# Patient Record
Sex: FEMALE | Race: OTHER | NOT HISPANIC OR LATINO
[De-identification: names, ages, dates, MRNs, and addresses within clinical notes are randomized per-mention and may not be internally consistent; named-entity substitution may affect disease eponyms.]

---

## 2017-08-01 ENCOUNTER — APPOINTMENT (OUTPATIENT)
Dept: ULTRASOUND IMAGING | Facility: HOSPITAL | Age: 77
End: 2017-08-01

## 2017-08-01 ENCOUNTER — OUTPATIENT (OUTPATIENT)
Dept: OUTPATIENT SERVICES | Facility: HOSPITAL | Age: 77
LOS: 1 days | End: 2017-08-01
Payer: MEDICARE

## 2017-08-01 PROBLEM — Z00.00 ENCOUNTER FOR PREVENTIVE HEALTH EXAMINATION: Status: ACTIVE | Noted: 2017-08-01

## 2017-08-01 PROCEDURE — 93970 EXTREMITY STUDY: CPT | Mod: 26

## 2017-08-01 PROCEDURE — 93970 EXTREMITY STUDY: CPT

## 2017-11-02 ENCOUNTER — APPOINTMENT (OUTPATIENT)
Dept: ULTRASOUND IMAGING | Facility: CLINIC | Age: 77
End: 2017-11-02

## 2017-11-02 ENCOUNTER — APPOINTMENT (OUTPATIENT)
Dept: CT IMAGING | Facility: CLINIC | Age: 77
End: 2017-11-02

## 2017-11-02 ENCOUNTER — OUTPATIENT (OUTPATIENT)
Dept: OUTPATIENT SERVICES | Facility: HOSPITAL | Age: 77
LOS: 1 days | End: 2017-11-02
Payer: COMMERCIAL

## 2017-11-02 DIAGNOSIS — Z00.8 ENCOUNTER FOR OTHER GENERAL EXAMINATION: ICD-10-CM

## 2017-11-02 PROCEDURE — 76376 3D RENDER W/INTRP POSTPROCES: CPT

## 2017-11-02 PROCEDURE — 72192 CT PELVIS W/O DYE: CPT

## 2017-11-02 PROCEDURE — 76882 US LMTD JT/FCL EVL NVASC XTR: CPT

## 2017-11-02 PROCEDURE — 76376 3D RENDER W/INTRP POSTPROCES: CPT | Mod: 26

## 2017-11-02 PROCEDURE — 76882 US LMTD JT/FCL EVL NVASC XTR: CPT | Mod: 26,RT

## 2017-11-02 PROCEDURE — 72192 CT PELVIS W/O DYE: CPT | Mod: 26

## 2017-11-07 DIAGNOSIS — S32.432D: ICD-10-CM

## 2017-11-07 DIAGNOSIS — M16.0 BILATERAL PRIMARY OSTEOARTHRITIS OF HIP: ICD-10-CM

## 2017-11-07 DIAGNOSIS — M79.89 OTHER SPECIFIED SOFT TISSUE DISORDERS: ICD-10-CM

## 2017-11-07 DIAGNOSIS — S32.512G: ICD-10-CM

## 2017-11-07 DIAGNOSIS — S32.111A: ICD-10-CM

## 2018-07-13 ENCOUNTER — EMERGENCY (EMERGENCY)
Facility: HOSPITAL | Age: 78
LOS: 1 days | Discharge: HOME | End: 2018-07-13
Attending: EMERGENCY MEDICINE | Admitting: EMERGENCY MEDICINE
Payer: MEDICARE

## 2018-07-13 VITALS
TEMPERATURE: 99 F | OXYGEN SATURATION: 97 % | HEIGHT: 66 IN | RESPIRATION RATE: 18 BRPM | WEIGHT: 210.1 LBS | DIASTOLIC BLOOD PRESSURE: 96 MMHG | HEART RATE: 84 BPM | SYSTOLIC BLOOD PRESSURE: 207 MMHG

## 2018-07-13 LAB
ALBUMIN SERPL ELPH-MCNC: 3.5 G/DL — SIGNIFICANT CHANGE UP (ref 3.3–5)
ALP SERPL-CCNC: 123 U/L — HIGH (ref 30–120)
ALT FLD-CCNC: 23 U/L DA — SIGNIFICANT CHANGE UP (ref 10–60)
ANION GAP SERPL CALC-SCNC: 5 MMOL/L — SIGNIFICANT CHANGE UP (ref 5–17)
APTT BLD: 33.2 SEC — SIGNIFICANT CHANGE UP (ref 27.5–37.4)
AST SERPL-CCNC: 30 U/L — SIGNIFICANT CHANGE UP (ref 10–40)
BASOPHILS # BLD AUTO: 0.03 K/UL — SIGNIFICANT CHANGE UP (ref 0–0.2)
BASOPHILS NFR BLD AUTO: 0.4 % — SIGNIFICANT CHANGE UP (ref 0–2)
BILIRUB SERPL-MCNC: 0.5 MG/DL — SIGNIFICANT CHANGE UP (ref 0.2–1.2)
BUN SERPL-MCNC: 31 MG/DL — HIGH (ref 7–23)
CALCIUM SERPL-MCNC: 8.7 MG/DL — SIGNIFICANT CHANGE UP (ref 8.4–10.5)
CHLORIDE SERPL-SCNC: 102 MMOL/L — SIGNIFICANT CHANGE UP (ref 96–108)
CK MB BLD-MCNC: 1.5 % — SIGNIFICANT CHANGE UP (ref 0–3.5)
CK MB CFR SERPL CALC: 6.1 NG/ML — HIGH (ref 0–3.6)
CK SERPL-CCNC: 412 U/L — HIGH (ref 26–192)
CO2 SERPL-SCNC: 27 MMOL/L — SIGNIFICANT CHANGE UP (ref 22–31)
CREAT SERPL-MCNC: 1.16 MG/DL — SIGNIFICANT CHANGE UP (ref 0.5–1.3)
D DIMER BLD IA.RAPID-MCNC: <150 NG/ML DDU — SIGNIFICANT CHANGE UP
EOSINOPHIL # BLD AUTO: 0.2 K/UL — SIGNIFICANT CHANGE UP (ref 0–0.5)
EOSINOPHIL NFR BLD AUTO: 2.6 % — SIGNIFICANT CHANGE UP (ref 0–6)
GLUCOSE SERPL-MCNC: 141 MG/DL — HIGH (ref 70–99)
HCT VFR BLD CALC: 42.6 % — SIGNIFICANT CHANGE UP (ref 34.5–45)
HGB BLD-MCNC: 14.1 G/DL — SIGNIFICANT CHANGE UP (ref 11.5–15.5)
IMM GRANULOCYTES NFR BLD AUTO: 0.1 % — SIGNIFICANT CHANGE UP (ref 0–1.5)
INR BLD: 1.06 RATIO — SIGNIFICANT CHANGE UP (ref 0.88–1.16)
LYMPHOCYTES # BLD AUTO: 1.46 K/UL — SIGNIFICANT CHANGE UP (ref 1–3.3)
LYMPHOCYTES # BLD AUTO: 18.8 % — SIGNIFICANT CHANGE UP (ref 13–44)
MCHC RBC-ENTMCNC: 32.6 PG — SIGNIFICANT CHANGE UP (ref 27–34)
MCHC RBC-ENTMCNC: 33.1 GM/DL — SIGNIFICANT CHANGE UP (ref 32–36)
MCV RBC AUTO: 98.4 FL — SIGNIFICANT CHANGE UP (ref 80–100)
MONOCYTES # BLD AUTO: 0.53 K/UL — SIGNIFICANT CHANGE UP (ref 0–0.9)
MONOCYTES NFR BLD AUTO: 6.8 % — SIGNIFICANT CHANGE UP (ref 2–14)
NEUTROPHILS # BLD AUTO: 5.55 K/UL — SIGNIFICANT CHANGE UP (ref 1.8–7.4)
NEUTROPHILS NFR BLD AUTO: 71.3 % — SIGNIFICANT CHANGE UP (ref 43–77)
PLATELET # BLD AUTO: 200 K/UL — SIGNIFICANT CHANGE UP (ref 150–400)
POTASSIUM SERPL-MCNC: 4.2 MMOL/L — SIGNIFICANT CHANGE UP (ref 3.5–5.3)
POTASSIUM SERPL-SCNC: 4.2 MMOL/L — SIGNIFICANT CHANGE UP (ref 3.5–5.3)
PROT SERPL-MCNC: 7.3 G/DL — SIGNIFICANT CHANGE UP (ref 6–8.3)
PROTHROM AB SERPL-ACNC: 11.6 SEC — SIGNIFICANT CHANGE UP (ref 9.8–12.7)
RBC # BLD: 4.33 M/UL — SIGNIFICANT CHANGE UP (ref 3.8–5.2)
RBC # FLD: 12.3 % — SIGNIFICANT CHANGE UP (ref 10.3–14.5)
SODIUM SERPL-SCNC: 134 MMOL/L — LOW (ref 135–145)
TROPONIN I SERPL-MCNC: 0.15 NG/ML — HIGH (ref 0.02–0.06)
WBC # BLD: 7.78 K/UL — SIGNIFICANT CHANGE UP (ref 3.8–10.5)
WBC # FLD AUTO: 7.78 K/UL — SIGNIFICANT CHANGE UP (ref 3.8–10.5)

## 2018-07-13 PROCEDURE — 93010 ELECTROCARDIOGRAM REPORT: CPT

## 2018-07-13 PROCEDURE — 99285 EMERGENCY DEPT VISIT HI MDM: CPT

## 2018-07-13 PROCEDURE — 71275 CT ANGIOGRAPHY CHEST: CPT | Mod: 26

## 2018-07-13 PROCEDURE — 71045 X-RAY EXAM CHEST 1 VIEW: CPT | Mod: 26

## 2018-07-13 RX ORDER — ASPIRIN/CALCIUM CARB/MAGNESIUM 324 MG
325 TABLET ORAL ONCE
Qty: 0 | Refills: 0 | Status: COMPLETED | OUTPATIENT
Start: 2018-07-13 | End: 2018-07-13

## 2018-07-13 RX ORDER — SODIUM CHLORIDE 9 MG/ML
1000 INJECTION INTRAMUSCULAR; INTRAVENOUS; SUBCUTANEOUS ONCE
Qty: 0 | Refills: 0 | Status: COMPLETED | OUTPATIENT
Start: 2018-07-13 | End: 2018-07-13

## 2018-07-13 RX ORDER — SODIUM CHLORIDE 9 MG/ML
3 INJECTION INTRAMUSCULAR; INTRAVENOUS; SUBCUTANEOUS ONCE
Qty: 0 | Refills: 0 | Status: COMPLETED | OUTPATIENT
Start: 2018-07-13 | End: 2018-07-13

## 2018-07-13 RX ADMIN — Medication 325 MILLIGRAM(S): at 18:50

## 2018-07-13 RX ADMIN — SODIUM CHLORIDE 3 MILLILITER(S): 9 INJECTION INTRAMUSCULAR; INTRAVENOUS; SUBCUTANEOUS at 18:52

## 2018-07-13 RX ADMIN — SODIUM CHLORIDE 1000 MILLILITER(S): 9 INJECTION INTRAMUSCULAR; INTRAVENOUS; SUBCUTANEOUS at 18:51

## 2018-07-13 NOTE — ED PROVIDER NOTE - OBJECTIVE STATEMENT
78 y/o F pt w/ PMHx HTN presents to the ED c/o intermittent mid to R sided CP x 4-5 weeks. Pt states episodes can last from 10 minutes to an hour, today the pain has lasted about an hour. States she went to start some gardening when the pain came on. Reports that she has noticed that it has been more difficult to perform strenuous activities, like walking up stairs. Also endorsing SOB. Reports she takes her blood pressure medication daily. Pt denies recent travel, fever, cough or any other complaints at this time.   PMD: Dr. Edmar Amaya  No cardiologist 78 y/o F pt w/ PMHx HTN presents to the ED c/o intermittent mid to R sided CP x 4-5 weeks. Pt states episodes can last from 10 minutes to an hour, today the pain has lasted about an hour. States she went to start some gardening when the pain came on. Reports that she has noticed that it has been more difficult to perform strenuous activities, like walking up stairs. Also endorsing mild SOB. Reports she takes her blood pressure medication daily. Pt denies recent travel, fever, cough or any other complaints at this time.  Pt has stress test scheduled with her husbands c ardiologist on monday.  PMD: Dr. Edmar Amaya  No cardiologist  Pt with hx HTN, no hx MI - pt took all her htn meds today

## 2018-07-13 NOTE — CONSULT NOTE ADULT - ASSESSMENT
The patient is a 77 year old female with a history of HTN who presents with atypical chest pain and borderline elevated troponin level.    Plan:  - The patient's symptoms are atypical for cardiac chest pain  - ECG with no evidence of ischemia or infarction  - First troponin borderline elevated at 0.15. This is either secondary to small NSTEMI or related to other hemodynamic factors (the patient is significantly elevated in ED).  - Give labetalol 200 mg PO now  - Received aspirin 325 mg  - CTA chest ordered to r/o PE, dissection  - Continue to trend cardiac enzymes. If next troponin is similar or improved and patient is pain-free, she can be discharged and follow-up for her scheduled testing on Monday. If troponin trends up, then she should stay for echo and consideration of cardiac catheterization.

## 2018-07-13 NOTE — ED PROVIDER NOTE - NOTES
Dr. Mcnally aware of repeat CE. Ok to d/c. She has nuclear stress test scheduled for Monday which she should go to

## 2018-07-13 NOTE — ED ADULT NURSE NOTE - OBJECTIVE STATEMENT
Amb to ED. Pt c/o sharp intermittent pain in her rt upper chest & shoulder. Denies SOB, diaphoresis or nausea. Denies any trauma. Scheduled for stress test on Monday. Color good. Skin warm & dry to touch Lungs -clear. Abd soft nontender. CM- RSR without ectopics noted.

## 2018-07-13 NOTE — CONSULT NOTE ADULT - SUBJECTIVE AND OBJECTIVE BOX
History of Present Illness: The patient is a 77 year old female with a history of HTN who presents with chest pain. The pain has been intermittent over past few weeks. Today she had an episode that started around 2 hours ago. It is right-sided with some right shoulder involvement. It was severe initially but now is a vague pain. The pain occurs randomly and is not exertional. She has had an episode that has awoken her up from sleep. No associated shortness of breath, dizziness, palpitations, nausea. She has a nuclear stress test and echo scheduled for Monday.    Past Medical/Surgical History:  HTN    Medications:  Lisinopril  Dyazide  Verapamil    Family History: Non-contributory family history of premature cardiovascular atherosclerotic disease    Social History: No tobacco, alcohol or drug use    Review of Systems:  General: No fevers, chills, weight loss or gain  Skin: No rashes, color changes  Cardiovascular: No chest pain, orthopnea  Respiratory: No shortness of breath, cough  Gastrointestinal: No nausea, abdominal pain  Genitourinary: No incontinence, pain with urination  Musculoskeletal: No pain, swelling, decreased range of motion  Neurological: No headache, weakness  Psychiatric: No depression, anxiety  Endocrine: No weight loss or gain, increased thirst  All other systems are comprehensively negative.    Physical Exam:  Vitals:        Vital Signs Last 24 Hrs  T(C): 37.1 (13 Jul 2018 18:13), Max: 37.1 (13 Jul 2018 18:13)  T(F): 98.7 (13 Jul 2018 18:13), Max: 98.7 (13 Jul 2018 18:13)  HR: 82 (13 Jul 2018 18:57) (82 - 84)  BP: 181/69 (13 Jul 2018 18:57) (181/69 - 207/96)  BP(mean): --  RR: 16 (13 Jul 2018 18:57) (16 - 18)  SpO2: 98% (13 Jul 2018 18:57) (97% - 98%)  General: NAD  HEENT: MMM  Neck: No JVD, no carotid bruit  Lungs: CTAB  CV: RRR, nl S1/S2, 2/6 systolic murmur  Abdomen: S/NT/ND, +BS  Extremities: No LE edema, no cyanosis  Neuro: AAOx3, non-focal  Skin: No rash    Labs:                        14.1   7.78  )-----------( 200      ( 13 Jul 2018 18:46 )             42.6     07-13    134<L>  |  102  |  31<H>  ----------------------------<  141<H>  4.2   |  27  |  1.16    Ca    8.7      13 Jul 2018 18:46    TPro  7.3  /  Alb  3.5  /  TBili  0.5  /  DBili  x   /  AST  30  /  ALT  23  /  AlkPhos  123<H>  07-13    CARDIAC MARKERS ( 13 Jul 2018 18:46 )  .154 ng/mL / x     / 412 U/L / x     / 6.1 ng/mL      PT/INR - ( 13 Jul 2018 18:46 )   PT: 11.6 sec;   INR: 1.06 ratio         PTT - ( 13 Jul 2018 18:46 )  PTT:33.2 sec    ECG: NSR, normal axis, no ST abnormality, mild QTc prolongation

## 2018-07-13 NOTE — ED PROVIDER NOTE - PROGRESS NOTE DETAILS
Pt seen by Dr SHAILESH Mcnally. Had dw him re pos CE. Agree with CTA, check 6 hr CE and reeval. Pt doing well, no acute changes or events. Will cont to monitor. Patient feels well, no chest pain at this time.  Copies of blood work and CT given.  Advised to f/u with PMD and obtain stress test at [facility] as per scheduled appointment 7/16 with Dr. Jg Perkins and Dr. Padron.  Patient expressed understanding, advised to return to ER for persistent chest pain, SOB, diaphoresis

## 2018-07-13 NOTE — ED PROVIDER NOTE - RESPIRATORY, MLM
Breath sounds clear and equal bilaterally. Breath sounds clear and equal bilaterally. no W/R/R . no chest wall tend.

## 2018-07-14 ENCOUNTER — INPATIENT (INPATIENT)
Facility: HOSPITAL | Age: 78
LOS: 0 days | Discharge: ANOTHER HOSPITAL | DRG: 282 | End: 2018-07-14
Attending: INTERNAL MEDICINE | Admitting: INTERNAL MEDICINE
Payer: MEDICARE

## 2018-07-14 VITALS
SYSTOLIC BLOOD PRESSURE: 147 MMHG | OXYGEN SATURATION: 98 % | TEMPERATURE: 98 F | DIASTOLIC BLOOD PRESSURE: 64 MMHG | RESPIRATION RATE: 19 BRPM | HEART RATE: 65 BPM

## 2018-07-14 VITALS
OXYGEN SATURATION: 99 % | HEART RATE: 68 BPM | SYSTOLIC BLOOD PRESSURE: 169 MMHG | DIASTOLIC BLOOD PRESSURE: 63 MMHG | TEMPERATURE: 98 F | RESPIRATION RATE: 16 BRPM

## 2018-07-14 VITALS
HEART RATE: 81 BPM | SYSTOLIC BLOOD PRESSURE: 176 MMHG | RESPIRATION RATE: 18 BRPM | OXYGEN SATURATION: 96 % | HEIGHT: 66 IN | DIASTOLIC BLOOD PRESSURE: 75 MMHG | WEIGHT: 210.1 LBS | TEMPERATURE: 98 F

## 2018-07-14 DIAGNOSIS — Z29.9 ENCOUNTER FOR PROPHYLACTIC MEASURES, UNSPECIFIED: ICD-10-CM

## 2018-07-14 DIAGNOSIS — K21.9 GASTRO-ESOPHAGEAL REFLUX DISEASE WITHOUT ESOPHAGITIS: ICD-10-CM

## 2018-07-14 DIAGNOSIS — S32.401S UNSPECIFIED FRACTURE OF RIGHT ACETABULUM, SEQUELA: Chronic | ICD-10-CM

## 2018-07-14 DIAGNOSIS — I21.4 NON-ST ELEVATION (NSTEMI) MYOCARDIAL INFARCTION: ICD-10-CM

## 2018-07-14 DIAGNOSIS — N32.81 OVERACTIVE BLADDER: ICD-10-CM

## 2018-07-14 DIAGNOSIS — I10 ESSENTIAL (PRIMARY) HYPERTENSION: ICD-10-CM

## 2018-07-14 LAB
APTT BLD: 162 SEC — SIGNIFICANT CHANGE UP (ref 27.5–37.4)
APTT BLD: 59.5 SEC — HIGH (ref 27.5–37.4)
CK MB BLD-MCNC: 0.2 % — SIGNIFICANT CHANGE UP (ref 0–3.5)
CK MB BLD-MCNC: 4.1 % — HIGH (ref 0–3.5)
CK MB BLD-MCNC: 4.6 % — HIGH (ref 0–3.5)
CK MB BLD-MCNC: 5.1 % — HIGH (ref 0–3.5)
CK MB CFR SERPL CALC: 0.5 NG/ML — SIGNIFICANT CHANGE UP (ref 0–3.6)
CK MB CFR SERPL CALC: 4.4 NG/ML — HIGH (ref 0–3.6)
CK MB CFR SERPL CALC: 5.6 NG/ML — HIGH (ref 0–3.6)
CK MB CFR SERPL CALC: 6.4 NG/ML — HIGH (ref 0–3.6)
CK SERPL-CCNC: 109 U/L — SIGNIFICANT CHANGE UP (ref 26–192)
CK SERPL-CCNC: 157 U/L — SIGNIFICANT CHANGE UP (ref 26–192)
CK SERPL-CCNC: 328 U/L — HIGH (ref 26–192)
CK SERPL-CCNC: 95 U/L — SIGNIFICANT CHANGE UP (ref 26–192)
HBA1C BLD-MCNC: 5.7 % — HIGH (ref 4–5.6)
HCT VFR BLD CALC: 41.2 % — SIGNIFICANT CHANGE UP (ref 34.5–45)
HGB BLD-MCNC: 13.7 G/DL — SIGNIFICANT CHANGE UP (ref 11.5–15.5)
MCHC RBC-ENTMCNC: 32.2 PG — SIGNIFICANT CHANGE UP (ref 27–34)
MCHC RBC-ENTMCNC: 33.3 GM/DL — SIGNIFICANT CHANGE UP (ref 32–36)
MCV RBC AUTO: 96.9 FL — SIGNIFICANT CHANGE UP (ref 80–100)
NRBC # BLD: 0 /100 WBCS — SIGNIFICANT CHANGE UP (ref 0–0)
NT-PROBNP SERPL-SCNC: 293 PG/ML — SIGNIFICANT CHANGE UP (ref 0–450)
PLATELET # BLD AUTO: 177 K/UL — SIGNIFICANT CHANGE UP (ref 150–400)
RBC # BLD: 4.25 M/UL — SIGNIFICANT CHANGE UP (ref 3.8–5.2)
RBC # FLD: 12.2 % — SIGNIFICANT CHANGE UP (ref 10.3–14.5)
TROPONIN I SERPL-MCNC: 0 NG/ML — LOW (ref 0.02–0.06)
TROPONIN I SERPL-MCNC: 2.23 NG/ML — HIGH (ref 0.02–0.06)
TROPONIN I SERPL-MCNC: 3.18 NG/ML — HIGH (ref 0.02–0.06)
TROPONIN I SERPL-MCNC: 3.8 NG/ML — HIGH (ref 0.02–0.06)
WBC # BLD: 6.39 K/UL — SIGNIFICANT CHANGE UP (ref 3.8–10.5)
WBC # FLD AUTO: 6.39 K/UL — SIGNIFICANT CHANGE UP (ref 3.8–10.5)

## 2018-07-14 PROCEDURE — 99284 EMERGENCY DEPT VISIT MOD MDM: CPT | Mod: 25

## 2018-07-14 PROCEDURE — 80053 COMPREHEN METABOLIC PANEL: CPT

## 2018-07-14 PROCEDURE — 82553 CREATINE MB FRACTION: CPT

## 2018-07-14 PROCEDURE — 71275 CT ANGIOGRAPHY CHEST: CPT

## 2018-07-14 PROCEDURE — 83036 HEMOGLOBIN GLYCOSYLATED A1C: CPT

## 2018-07-14 PROCEDURE — 93306 TTE W/DOPPLER COMPLETE: CPT | Mod: 26

## 2018-07-14 PROCEDURE — 85379 FIBRIN DEGRADATION QUANT: CPT

## 2018-07-14 PROCEDURE — 84484 ASSAY OF TROPONIN QUANT: CPT

## 2018-07-14 PROCEDURE — 82550 ASSAY OF CK (CPK): CPT

## 2018-07-14 PROCEDURE — 96360 HYDRATION IV INFUSION INIT: CPT

## 2018-07-14 PROCEDURE — 99285 EMERGENCY DEPT VISIT HI MDM: CPT

## 2018-07-14 PROCEDURE — 83880 ASSAY OF NATRIURETIC PEPTIDE: CPT

## 2018-07-14 PROCEDURE — 85610 PROTHROMBIN TIME: CPT

## 2018-07-14 PROCEDURE — 85730 THROMBOPLASTIN TIME PARTIAL: CPT

## 2018-07-14 PROCEDURE — 93005 ELECTROCARDIOGRAM TRACING: CPT

## 2018-07-14 PROCEDURE — 93306 TTE W/DOPPLER COMPLETE: CPT

## 2018-07-14 PROCEDURE — 71045 X-RAY EXAM CHEST 1 VIEW: CPT

## 2018-07-14 PROCEDURE — 36415 COLL VENOUS BLD VENIPUNCTURE: CPT

## 2018-07-14 PROCEDURE — 96374 THER/PROPH/DIAG INJ IV PUSH: CPT

## 2018-07-14 PROCEDURE — 93010 ELECTROCARDIOGRAM REPORT: CPT | Mod: 76

## 2018-07-14 PROCEDURE — 99285 EMERGENCY DEPT VISIT HI MDM: CPT | Mod: 25

## 2018-07-14 PROCEDURE — 85027 COMPLETE CBC AUTOMATED: CPT

## 2018-07-14 RX ORDER — ASPIRIN/CALCIUM CARB/MAGNESIUM 324 MG
1 TABLET ORAL
Qty: 0 | Refills: 0 | COMMUNITY

## 2018-07-14 RX ORDER — PIROXICAM 20 MG/1
1 CAPSULE ORAL
Qty: 0 | Refills: 0 | COMMUNITY

## 2018-07-14 RX ORDER — LISINOPRIL 2.5 MG/1
20 TABLET ORAL DAILY
Qty: 0 | Refills: 0 | Status: DISCONTINUED | OUTPATIENT
Start: 2018-07-14 | End: 2018-07-14

## 2018-07-14 RX ORDER — HEPARIN SODIUM 5000 [USP'U]/ML
5000 INJECTION INTRAVENOUS; SUBCUTANEOUS ONCE
Qty: 0 | Refills: 0 | Status: COMPLETED | OUTPATIENT
Start: 2018-07-14 | End: 2018-07-14

## 2018-07-14 RX ORDER — CLOPIDOGREL BISULFATE 75 MG/1
1 TABLET, FILM COATED ORAL
Qty: 0 | Refills: 0 | COMMUNITY
Start: 2018-07-14

## 2018-07-14 RX ORDER — HYDROCHLOROTHIAZIDE 25 MG
0 TABLET ORAL
Qty: 0 | Refills: 0 | COMMUNITY

## 2018-07-14 RX ORDER — HEPARIN SODIUM 5000 [USP'U]/ML
0 INJECTION INTRAVENOUS; SUBCUTANEOUS
Qty: 0 | Refills: 0 | COMMUNITY
Start: 2018-07-14

## 2018-07-14 RX ORDER — DOCUSATE SODIUM 100 MG
1 CAPSULE ORAL
Qty: 0 | Refills: 0 | COMMUNITY

## 2018-07-14 RX ORDER — ESOMEPRAZOLE MAGNESIUM 40 MG/1
1 CAPSULE, DELAYED RELEASE ORAL
Qty: 0 | Refills: 0 | COMMUNITY

## 2018-07-14 RX ORDER — PANTOPRAZOLE SODIUM 20 MG/1
40 TABLET, DELAYED RELEASE ORAL
Qty: 0 | Refills: 0 | Status: DISCONTINUED | OUTPATIENT
Start: 2018-07-14 | End: 2018-07-14

## 2018-07-14 RX ORDER — OXYBUTYNIN CHLORIDE 5 MG
5 TABLET ORAL
Qty: 0 | Refills: 0 | Status: DISCONTINUED | OUTPATIENT
Start: 2018-07-14 | End: 2018-07-14

## 2018-07-14 RX ORDER — TOLTERODINE TARTRATE 1 MG/1
0 TABLET, FILM COATED ORAL
Qty: 0 | Refills: 0 | COMMUNITY

## 2018-07-14 RX ORDER — ATORVASTATIN CALCIUM 80 MG/1
1 TABLET, FILM COATED ORAL
Qty: 0 | Refills: 0 | COMMUNITY
Start: 2018-07-14

## 2018-07-14 RX ORDER — LISINOPRIL 2.5 MG/1
0 TABLET ORAL
Qty: 0 | Refills: 0 | COMMUNITY

## 2018-07-14 RX ORDER — ASPIRIN/CALCIUM CARB/MAGNESIUM 324 MG
1 TABLET ORAL
Qty: 0 | Refills: 0 | COMMUNITY
Start: 2018-07-14

## 2018-07-14 RX ORDER — CLOPIDOGREL BISULFATE 75 MG/1
300 TABLET, FILM COATED ORAL ONCE
Qty: 0 | Refills: 0 | Status: COMPLETED | OUTPATIENT
Start: 2018-07-14 | End: 2018-07-14

## 2018-07-14 RX ORDER — ZOLPIDEM TARTRATE 10 MG/1
1 TABLET ORAL
Qty: 0 | Refills: 0 | COMMUNITY

## 2018-07-14 RX ORDER — METOPROLOL TARTRATE 50 MG
25 TABLET ORAL EVERY 12 HOURS
Qty: 0 | Refills: 0 | Status: DISCONTINUED | OUTPATIENT
Start: 2018-07-14 | End: 2018-07-14

## 2018-07-14 RX ORDER — ATORVASTATIN CALCIUM 80 MG/1
20 TABLET, FILM COATED ORAL AT BEDTIME
Qty: 0 | Refills: 0 | Status: DISCONTINUED | OUTPATIENT
Start: 2018-07-14 | End: 2018-07-14

## 2018-07-14 RX ORDER — VERAPAMIL HCL 240 MG
240 CAPSULE, EXTENDED RELEASE PELLETS 24 HR ORAL DAILY
Qty: 0 | Refills: 0 | Status: DISCONTINUED | OUTPATIENT
Start: 2018-07-14 | End: 2018-07-14

## 2018-07-14 RX ORDER — DOCUSATE SODIUM 100 MG
100 CAPSULE ORAL
Qty: 0 | Refills: 0 | Status: DISCONTINUED | OUTPATIENT
Start: 2018-07-14 | End: 2018-07-14

## 2018-07-14 RX ORDER — METOPROLOL TARTRATE 50 MG
1 TABLET ORAL
Qty: 0 | Refills: 0 | COMMUNITY
Start: 2018-07-14

## 2018-07-14 RX ORDER — HEPARIN SODIUM 5000 [USP'U]/ML
INJECTION INTRAVENOUS; SUBCUTANEOUS
Qty: 25000 | Refills: 0 | Status: DISCONTINUED | OUTPATIENT
Start: 2018-07-14 | End: 2018-07-14

## 2018-07-14 RX ORDER — ASPIRIN/CALCIUM CARB/MAGNESIUM 324 MG
325 TABLET ORAL DAILY
Qty: 0 | Refills: 0 | Status: DISCONTINUED | OUTPATIENT
Start: 2018-07-14 | End: 2018-07-14

## 2018-07-14 RX ORDER — VERAPAMIL HCL 240 MG
1 CAPSULE, EXTENDED RELEASE PELLETS 24 HR ORAL
Qty: 0 | Refills: 0 | COMMUNITY

## 2018-07-14 RX ORDER — ZOLPIDEM TARTRATE 10 MG/1
5 TABLET ORAL AT BEDTIME
Qty: 0 | Refills: 0 | Status: DISCONTINUED | OUTPATIENT
Start: 2018-07-14 | End: 2018-07-14

## 2018-07-14 RX ORDER — CLOPIDOGREL BISULFATE 75 MG/1
75 TABLET, FILM COATED ORAL DAILY
Qty: 0 | Refills: 0 | Status: DISCONTINUED | OUTPATIENT
Start: 2018-07-15 | End: 2018-07-14

## 2018-07-14 RX ADMIN — Medication 325 MILLIGRAM(S): at 14:51

## 2018-07-14 RX ADMIN — LISINOPRIL 20 MILLIGRAM(S): 2.5 TABLET ORAL at 08:58

## 2018-07-14 RX ADMIN — HEPARIN SODIUM 5000 UNIT(S): 5000 INJECTION INTRAVENOUS; SUBCUTANEOUS at 04:37

## 2018-07-14 RX ADMIN — HEPARIN SODIUM 1000 UNIT(S)/HR: 5000 INJECTION INTRAVENOUS; SUBCUTANEOUS at 04:37

## 2018-07-14 RX ADMIN — CLOPIDOGREL BISULFATE 300 MILLIGRAM(S): 75 TABLET, FILM COATED ORAL at 04:37

## 2018-07-14 RX ADMIN — Medication 240 MILLIGRAM(S): at 14:48

## 2018-07-14 RX ADMIN — HEPARIN SODIUM 700 UNIT(S)/HR: 5000 INJECTION INTRAVENOUS; SUBCUTANEOUS at 15:27

## 2018-07-14 RX ADMIN — Medication 25 MILLIGRAM(S): at 18:03

## 2018-07-14 RX ADMIN — PANTOPRAZOLE SODIUM 40 MILLIGRAM(S): 20 TABLET, DELAYED RELEASE ORAL at 08:53

## 2018-07-14 RX ADMIN — HEPARIN SODIUM 0 UNIT(S)/HR: 5000 INJECTION INTRAVENOUS; SUBCUTANEOUS at 14:15

## 2018-07-14 RX ADMIN — Medication 100 MILLIGRAM(S): at 18:05

## 2018-07-14 NOTE — DISCHARGE NOTE ADULT - MEDICATION SUMMARY - MEDICATIONS TO CHANGE
I will SWITCH the dose or number of times a day I take the medications listed below when I get home from the hospital:    Ecotrin Adult Low Strength 81 mg oral delayed release tablet  -- 1 tab(s) by mouth once a day

## 2018-07-14 NOTE — H&P ADULT - PROBLEM SELECTOR PLAN 1
Patient with NSTEMI POA.  Will place patient on ASA/Plavix/Lopressor/Verapamil and heparin drip as per cardiology.   Monitor on Tele.  Serial CPK and Troponin.  Lipitor empirically.   Cardiology consult noted and appreciated.   Will need cardiac cath, patient is being transferred to [facility] as per her wishes.   Arrangements are being made by cardiology for transfer.

## 2018-07-14 NOTE — DISCHARGE NOTE ADULT - SECONDARY DIAGNOSIS.
Essential hypertension Gastroesophageal reflux disease, esophagitis presence not specified OAB (overactive bladder) Primary osteoarthritis, unspecified site

## 2018-07-14 NOTE — DISCHARGE NOTE ADULT - MEDICATION SUMMARY - MEDICATIONS TO TAKE
I will START or STAY ON the medications listed below when I get home from the hospital:    aspirin 325 mg oral delayed release tablet  -- 1 tab(s) by mouth once a day  -- Indication: For NSTEMI (non-ST elevated myocardial infarction)    lisinopril 20 mg oral tablet  -- orally 2 times a day  -- Indication: For Essential hypertension    verapamil 240 mg/24 hours oral capsule, extended release  -- 1 cap(s) by mouth once a day  -- Indication: For Essential hypertension    heparin 100 units/mL-D5% intravenous solution  -- Heparin drip perotocol for anticoagulation.   -- Indication: For NSTEMI (non-ST elevated myocardial infarction)    atorvastatin 20 mg oral tablet  -- 1 tab(s) by mouth once a day (at bedtime)  -- Indication: For NSTEMI (non-ST elevated myocardial infarction)    clopidogrel 75 mg oral tablet  -- 1 tab(s) by mouth once a day  -- Indication: For NSTEMI (non-ST elevated myocardial infarction)    Ambien 10 mg oral tablet  -- 1 tab(s) by mouth once a day (at bedtime)  -- Indication: For insomnia    metoprolol tartrate 25 mg oral tablet  -- 1 tab(s) by mouth every 12 hours  -- Indication: For Essential hypertension/NSTEMI    Colace 100 mg oral capsule  -- 1 cap(s) by mouth 2 times a day  -- Indication: For Constipation    NexIUM 40 mg oral delayed release capsule  -- 1 cap(s) by mouth once a day  -- Indication: For GERD    tolterodine  -- orally once a day  -- Indication: For OAB (overactive bladder)

## 2018-07-14 NOTE — H&P ADULT - PROBLEM SELECTOR PLAN 2
Continue patient on Metoprolol, Verapamil and lisinopril with holding parameters.   Monitor blood pressure per routine.

## 2018-07-14 NOTE — H&P ADULT - NSHPSOCIALHISTORY_GEN_ALL_CORE
Social History:    Marital Status:   Occupation: Retired  Lives with:     Substance Use : None  Tobacco Usage:  (X) never smoked   (   ) former smoker   (   ) current smoker  (     ) pack year  (        ) last tobacco use date  Alcohol Usage: Denies    (X) Advanced Directives: (     ) declined   [X] health care proxy

## 2018-07-14 NOTE — H&P ADULT - NSHPREVIEWOFSYSTEMS_GEN_ALL_CORE
REVIEW OF SYSTEMS:  CONSTITUTIONAL: No fever, weight loss, or fatigue  EYES: No eye pain, visual disturbances, or discharge  ENMT:  No difficulty hearing, tinnitus, vertigo; No sinus or throat pain  NECK: No pain or stiffness  BREASTS: No pain, masses, or nipple discharge  RESPIRATORY: No cough, wheezing, chills or hemoptysis; No shortness of breath  CARDIOVASCULAR: + chest pain, no palpitations, dizziness, or leg swelling  GASTROINTESTINAL: No abdominal or epigastric pain. No nausea, vomiting, or hematemesis; No diarrhea or constipation. No melena or hematochezia.  GENITOURINARY: No dysuria, frequency, hematuria, or incontinence  NEUROLOGICAL: No headaches, memory loss, loss of strength, numbness, or tremors  SKIN: No itching, burning, rashes, or lesions   LYMPH NODES: No enlarged glands  ENDOCRINE: No heat or cold intolerance; No hair loss; No polydipsia or polyuria  MUSCULOSKELETAL: No joint pain or swelling; No muscle, back, or extremity pain  PSYCHIATRIC: No depression, anxiety, mood swings, or difficulty sleeping  HEME/LYMPH: No easy bruising, or bleeding gums  ALLERGY AND IMMUNOLOGIC: No hives or eczema

## 2018-07-14 NOTE — H&P ADULT - NSHPPHYSICALEXAM_GEN_ALL_CORE
Vital Signs Last 24 Hrs  T(C): 36.9 (14 Jul 2018 14:00), Max: 37.1 (13 Jul 2018 18:13)  T(F): 98.4 (14 Jul 2018 14:00), Max: 98.7 (13 Jul 2018 18:13)  HR: 71 (14 Jul 2018 14:00) (68 - 85)  BP: 157/56 (14 Jul 2018 14:00) (141/71 - 207/96)  BP(mean): --  RR: 19 (14 Jul 2018 14:00) (14 - 20)  SpO2: 98% (14 Jul 2018 14:00) (96% - 99%)    PHYSICAL EXAM:  GENERAL: NAD, well-groomed, well-developed  HEAD:  Atraumatic, Normocephalic  EYES: EOMI, PERRLA, conjunctiva and sclera clear  ENMT: No tonsillar erythema, exudates, or enlargement; Moist mucous membranes, Good dentition, No lesions  NECK: Supple, No JVD, Normal thyroid  CHEST/LUNG: Clear to auscultation bilaterally; No rales, rhonchi, wheezing, or rubs  HEART: Regular rate and rhythm; No murmurs, rubs, or gallops  ABDOMEN: Soft, Nontender, Nondistended; Bowel sounds present  EXTREMITIES:  2+ Peripheral Pulses, No clubbing, cyanosis, or edema  MS: No joint swelling or deformity.   LYMPH: No lymphadenopathy noted  SKIN: No rashes or lesions  NERVOUS SYSTEM:  Motor Strength 4/5 B/L upper and lower extremities; DTRs 2+ intact and symmetric  PSYCH:  Alert & Oriented X3, Good concentration.

## 2018-07-14 NOTE — DISCHARGE NOTE ADULT - NS AS ACTIVITY OBS
Do not drive or operate machinery/Do not make important decisions/Walking-Outdoors allowed/No Heavy lifting/straining/Bathing allowed/Showering allowed/Walking-Indoors allowed

## 2018-07-14 NOTE — DISCHARGE NOTE ADULT - CARE PROVIDER_API CALL
Jaison Amaya), Internal Medicine  [address] [place] [address]  Phone: [phone]  Fax: [phone]

## 2018-07-14 NOTE — ED PROVIDER NOTE - CHPI ED SYMPTOMS NEG
no diaphoresis/no cough/no syncope/no shortness of breath/no vomiting/no fever/no back pain/no nausea

## 2018-07-14 NOTE — ED ADULT NURSE REASSESSMENT NOTE - NS ED NURSE REASSESS COMMENT FT1
pt trop lab was drawn at midnight, trop level was resulted as 0.00. pt was then discharged. Lab advised me that there was an additional specimen. The additional specimen resulted in elevated trop level. pt called and asked to come back to ED for trop lab clarification.
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
## 2018-07-14 NOTE — ED PROVIDER NOTE - OBJECTIVE STATEMENT
77 year old female with a history of HTN presents needing repeat blood work. Patient was seen in the ER yesterday for atypical chest pain intermittent x 2-3 weeks.  She was seen by cardiology (Dr. Mayco Mcnally) and had a normal EKG. 1st troponin mildly elevated and second troponin reported as negative 0.0.  given. Patient had no chest pain and repeat EKG unremarkable, patient d/c'd home.  Shortly after her discharge, ED received a call from the lab stating they had a second specimen for cardiac enzymes with patient label.  This specimen resulted in a troponin of 1.158 and patient was immediately called at home and advised to return to the ER. Patient still denies chest pain, SOB, diaphoresis. PMBISI Amaya
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
## 2018-07-14 NOTE — H&P ADULT - NSHPLABSRESULTS_GEN_ALL_CORE
LABS:                    13.7   6.39  )-----------( 177      ( 14 Jul 2018 13:20 )             41.2     13 Jul 2018 18:46    134    |  102    |  31     ----------------------------<  141    4.2     |  27     |  1.16     Ca    8.7        13 Jul 2018 18:46    TPro  7.3    /  Alb  3.5    /  TBili  0.5    /  DBili  x      /  AST  30     /  ALT  23     /  AlkPhos  123    13 Jul 2018 18:46    PT/INR - ( 13 Jul 2018 18:46 )   PT: 11.6 sec;   INR: 1.06 ratio       PTT - ( 14 Jul 2018 13:20 )  PTT:162 sec    Creatine Kinase, Serum: 109 U/L (07-14 @ 13:18)  Creatine Kinase, Serum: 157 U/L (07-14 @ 03:33)  Creatine Kinase, Serum: 328 U/L (07-13 @ 23:39)  Creatine Kinase, Serum: 412 U/L (07-13 @ 18:46)    Troponin I, Serum: 3.184 ng/mL (07-14 @ 13:18)  Troponin I, Serum: 2.227 ng/mL (07-14 @ 03:33)  Troponin I, Serum: .000 ng/mL (07-13 @ 23:39)  Troponin I, Serum: .154 ng/mL (07-13 @ 18:46)    < from: US Transthoracic Echocardiogram w/Doppler Complete (07.14.18 @ 09:52) >    EXAM:  US TTE W DOPPLER COMPLETE                        PROCEDURE DATE:  07/14/2018    CONCLUSIONS:  Normal left ventricular function with an overall ejection fraction is 65%.  Aortic valve sclerosis.  Mild mitral insufficiency.  Diastolic dysfunction.    < end of copied text >  < from: CT Angio Chest w/ IV Cont (07.13.18 @ 20:01) >    EXAM:  CT ANGIO CHEST (W)AW IC                        PROCEDURE DATE:  07/13/2018    INTERPRETATION:  Clinical information: Chest pain    Axial images obtained, coronal and sagittal images computer reformatted.   94 cc of Omnipaque 350 intravenously administered, 6 cc discarded. MIP   images obtained.    No evidence of pulmonary embolism. Normal contrast enhancement of   pulmonary arterial system.    No pericardial effusion. Coronary artery calcifications visible.      No thoracic aortic aneurysm. No mediastinal or hilar lesions identified.    Central airway intact. Tiny hypodensities present in the thyroid   parenchyma.    No effusion vascular congestion or pneumothorax. Scarring, region of left   cardiophrenic angle. Similar finding right cardiophrenic angle. Minimal   bullous disease both upper lobes.   Trace linear stranding right lung   base. 3 mm subpleural nodule right lower lung.    A small hiatus hernia is present.    Right adrenal gland normal in appearance. 16mm left adrenal nodule   likely adenoma in a patient this age group. The spleen is not enlarged.   Kidneys not totally included on this study. Hypodensities in both   kidneys, small cysts. Dextroscoliosis dorsal spine. Hypodensity right   hepatic lobe series 4 image 137 likely a cyst.      IMPRESSION: No evidence of pulmonary embolism, see above report.    < end of copied text >    < from: Xray Chest 1 View AP/PA (07.13.18 @ 20:14) >    EXAM:  XR CHEST AP OR PA 1V                        PROCEDURE DATE:  07/13/2018    INTERPRETATION:  Clinical information: Chest pain    Frontal view of the chest    No prior studies present for comparison    Clear lungs. No sign of lobar consolidation vascular congestion or   effusion. Heart size within normal limits. Dextroscoliosis dorsal spine.    IMPRESSION:    See above report.    < end of copied text >

## 2018-07-14 NOTE — ED PROVIDER NOTE - NOTES
Aware of positive troponin.  Admit patient, start heparin.  He (or his father) will see patient in the AM.

## 2018-07-14 NOTE — DISCHARGE NOTE ADULT - MEDICATION SUMMARY - MEDICATIONS TO STOP TAKING
I will STOP taking the medications listed below when I get home from the hospital:    hydroCHLOROthiazide  -- every other day    piroxicam 20 mg oral capsule  -- 1 cap(s) by mouth once a day

## 2018-07-14 NOTE — DISCHARGE NOTE ADULT - PLAN OF CARE
Get better. Low salt, low cholesterol diet.  Patient is being transferred to [facility] for further care.   Patient to follow up with Dr. Edmar Amaya after discharge from [place].
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
## 2018-07-14 NOTE — CONSULT NOTE ADULT - ASSESSMENT
76y/o w/f over weight. Seen at [facility] ER.  History HTN, high cholesterol, reform smoker.  Mother with CAD.    Seen complaining of vague right chest and right shoulder pain and occasional some shortness of breath.  This occurs at any time for the last few weeks.  Patient was called and told to come back to the hospital for elevated troponin.    Impression:  NSTEMI    Plan:  - R/O MI protocol.  - On Metoprolol, Lisinopril and Verapamil  - Echocardiogram results pending.  - Watch blood pressure.  - Patient on Plavix, ASA, IV heparin.  - Case discussed with Dr. Stockton and will transfer patient to [facility] when bed available for further cardiac treatment.  - Case also discussed with daughter-Troy.

## 2018-08-27 ENCOUNTER — APPOINTMENT (OUTPATIENT)
Dept: ORTHOPEDIC SURGERY | Facility: CLINIC | Age: 78
End: 2018-08-27
Payer: COMMERCIAL

## 2018-08-27 DIAGNOSIS — S32.409D UNSPECIFIED FRACTURE OF UNSPECIFIED ACETABULUM, SUBSEQUENT ENCOUNTER FOR FRACTURE WITH ROUTINE HEALING: ICD-10-CM

## 2018-08-27 DIAGNOSIS — M17.0 BILATERAL PRIMARY OSTEOARTHRITIS OF KNEE: ICD-10-CM

## 2018-08-27 PROCEDURE — 72170 X-RAY EXAM OF PELVIS: CPT

## 2018-08-27 PROCEDURE — 99214 OFFICE O/P EST MOD 30 MIN: CPT

## 2018-08-27 PROCEDURE — 73564 X-RAY EXAM KNEE 4 OR MORE: CPT | Mod: 50

## 2020-06-03 PROBLEM — K21.9 GASTRO-ESOPHAGEAL REFLUX DISEASE WITHOUT ESOPHAGITIS: Chronic | Status: ACTIVE | Noted: 2018-07-14

## 2020-06-03 PROBLEM — M19.91 PRIMARY OSTEOARTHRITIS, UNSPECIFIED SITE: Chronic | Status: ACTIVE | Noted: 2018-07-14

## 2020-06-03 PROBLEM — I10 ESSENTIAL (PRIMARY) HYPERTENSION: Chronic | Status: ACTIVE | Noted: 2018-07-14

## 2020-06-03 PROBLEM — N32.81 OVERACTIVE BLADDER: Chronic | Status: ACTIVE | Noted: 2018-07-14
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
## 2020-06-15 ENCOUNTER — APPOINTMENT (OUTPATIENT)
Dept: DISASTER EMERGENCY | Facility: CLINIC | Age: 80
End: 2020-06-15

## 2020-09-04 ENCOUNTER — APPOINTMENT (OUTPATIENT)
Dept: CT IMAGING | Facility: CLINIC | Age: 80
End: 2020-09-04
Payer: MEDICARE

## 2020-09-04 ENCOUNTER — OUTPATIENT (OUTPATIENT)
Dept: OUTPATIENT SERVICES | Facility: HOSPITAL | Age: 80
LOS: 1 days | End: 2020-09-04
Payer: MEDICARE

## 2020-09-04 DIAGNOSIS — K31.9 DISEASE OF STOMACH AND DUODENUM, UNSPECIFIED: ICD-10-CM

## 2020-09-04 DIAGNOSIS — R10.13 EPIGASTRIC PAIN: ICD-10-CM

## 2020-09-04 DIAGNOSIS — S32.401S UNSPECIFIED FRACTURE OF RIGHT ACETABULUM, SEQUELA: Chronic | ICD-10-CM

## 2020-09-04 PROCEDURE — 71260 CT THORAX DX C+: CPT | Mod: 26

## 2020-09-04 PROCEDURE — 74177 CT ABD & PELVIS W/CONTRAST: CPT | Mod: 26

## 2020-09-04 PROCEDURE — 82565 ASSAY OF CREATININE: CPT

## 2020-09-04 PROCEDURE — 71260 CT THORAX DX C+: CPT

## 2020-09-04 PROCEDURE — 74177 CT ABD & PELVIS W/CONTRAST: CPT

## 2022-04-29 NOTE — DISCHARGE NOTE ADULT - PATIENT PORTAL LINK FT
PACU
You can access the [facility] Patient Portal, offered by [employer], by registering with the following website: [email or web address]

## 2022-06-11 NOTE — H&P ADULT - DOES THIS PATIENT HAVE A HISTORY OF OR HAS BEEN DX WITH HEART FAILURE?
[facility]    History and Physical - Hospitalist Service       Date of Admission:  6/10/2022    Assessment & Plan      Rajiv Rodriguez is a 68 year old male admitted on 6/10/2022. He presented to the emergency department for evaluation of left arm and leg numbness and incoordination and was found to have an acute stroke for which he is being admitted for further evaluation and treatment.    Cerebrovascular accident (CVA), unspecified mechanism  PFO (patent foramen ovale)   Mixed hyperlipidemia  Prior history of CVA in 2012. Has a known PFO which has not yet been repaired. Presented with transient left arm followed by left leg weakness / incoordination. MRI with acute ischemia involving right precentral gyrus. EKG shows sinus rhythm with PACs. No known history of arrhythmias. Taking aspirin 81 mg and Plavix 75 mg at time of admission. Case discussed between emergency department and stroke neuro.  - Stop aspirin / Plavix  - Ticagrelor 180 mg load, followed by 90 mg bid  - P2Y12 level (attempted to add on to admission blood, but this may not be possible)  - TTE with bubble (OK to wait for this to be done after the weekend or as outpatient)  - Bilateral lower extremity ultrasound to rule out thrombus  - Pelvic MRV to rule out thrombus  - Monitor on telemetry   - Continue atorvastatin 40 mg daily  - A1c, lipids pending    Schizophrenia   Stable mood on admission Managed prior to admission with Abilify 20 mg daily, Zyprexa 5 mg qam / 10 mg qhs, and prn gabapentin (for anxiety).  - Continue home medications    Insurance issues  Patient lost his insurance, which is preventing him from having his PFO repaired.   - Care Transitions consult       Diet: Combination Diet Regular Diet; Low Saturated Fat Diet  DVT Prophylaxis: Pneumatic Compression Devices  Clark Catheter: Not present  Central Lines: None  Cardiac Monitoring: ACTIVE order. Indication: Stroke, acute (48 hours)  Code Status: Full Code  -  "discussed at time of admission       Disposition Plan   Expected Discharge: 1-2 days   Anticipated discharge location:  Awaiting care coordination huddle        The patient's care was discussed with the Attending Physician, Dr. Narciso Cedeno and Patient.    Lillian Higgins PA-C  Hospitalist Service  [facility]  Securely message with the Vocera Web Console (learn more here)  Text page via [employer] Paging/Directory         ______________________________________________________________________    Chief Complaint   \"My left arm was incoordinated yesterday, and my left leg was incoordinated today.\"    History is obtained from the patient, review of EMR, and emergency department sign out from Dr. Dariel Henderson.    History of Present Illness   Rajiv Rodriguez is a 68 year old male who presented to the emergency department for evaluation of neurologic changes.    Patient's left arm was uncoordinated yesterday, had difficulty flexing and extending his elbow due to some weakness and incoordination; this persisted for a few hours but and then improved, but is still minimally persisting.  Today his left leg \"did not function right, and I had difficulty walking.\"  This lasted for about 30 minutes and had resolved by the time he arrived in the emergency department.  Work-up in the emergency department revealed an acute stroke in the right precentral gyrus.    He denies any associated speech changes, confusion, swallowing difficulties, numbness or tingling, headache, vision changes.    Patient has a known PFO and has pursued initial work-up to have this repaired.  However, repair has been delayed due to the combination of an esophageal stricture identified when having his FRIEDA which required dilatation, and then patient lost his insurance, so he has been unable to pursue PFO closure.  Review of systems    Review of systems:  Has chronic difficulty sleeping.  He is occasionally constipated.  The remainder " review of systems is negative.      Review of Systems    The 10 point Review of Systems is negative other than noted in the HPI or here.     Past Medical History    I have reviewed this patient's medical history and updated it with pertinent information if needed.   Past Medical History:   Diagnosis Date     Acute exacerbation of chronic paranoid schizophrenia (H) 3/1/2016     Esophageal stricture 08/28/2018    Esophageal stricture 08/28/2018, s/p dilation     Stroke (H) 11/23/2012    symptoms of sudden unsteadiness and hoarseness of voice. 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       Past Surgical History   I have reviewed this patient's surgical history and updated it with pertinent information if needed.  Past Surgical History:   Procedure Laterality Date     DILATE ESOPHAGUS  08/2017    ESOPHAGEAL DILATION     PROSTATE SURGERY  08/2016    LASER OF PROSTATE W/ GREEN LIGHT PVP       Social History   I have reviewed this patient's social history and updated it with pertinent information if needed.  Social History     Tobacco Use     Smoking status: Never Smoker     Smokeless tobacco: Never Used   Substance Use Topics     Alcohol use: No     Drug use: No       Family History   I have reviewed this patient's family history and updated it with pertinent information if needed.  Family History   Problem Relation Age of Onset     Alzheimer Disease Mother      Cancer Father         skin       Prior to Admission Medications   Prior to Admission Medications   Prescriptions Last Dose Informant Patient Reported? Taking?   ARIPiprazole (ABILIFY) 20 MG tablet   No Yes   Sig: Take 1 tablet (20 mg) by mouth every morning   OLANZapine (ZYPREXA) 10 MG tablet   No No   Sig: Take 1 tablet (10 mg) by mouth At Bedtime   OLANZapine (ZYPREXA) 5 MG tablet   No No   Sig: Take 1 tablet (5 mg) by mouth every morning   atorvastatin  (LIPITOR) 40 MG tablet   No Yes   Sig: Take 1 tablet (40 mg) by mouth daily   clopidogrel (PLAVIX) 75 MG tablet   No No   Sig: Take 1 tablet (75 mg) by mouth daily   gabapentin (NEURONTIN) 100 MG capsule   No No   Sig: Take 1-3 capsules (100-300 mg) by mouth 3 times daily as needed (anxiety)      Facility-Administered Medications: None     Allergies   No Known Allergies    Physical Exam   Vital Signs: Temp: 97.5  F (36.4  C) Temp src: Oral BP: 115/63 Pulse: 59   Resp: 18 SpO2: 97 % O2 Device: None (Room air)    Weight: 181 lbs 14.07 oz    Constitutional: Alert, oriented, cooperative. No apparent distress, appears nontoxic. Speaking in full sentences.     Eyes: Eyes are clear, pupils are reactive. No scleral icterus. Extraocular movements intact.    HEENT: Oropharynx is clear and moist, no lesions. Normocephalic, no evidence of cranial trauma.      Cardiovascular: Regular rhythm and rate, normal S1 and S2. No murmur, rubs, or gallops. Peripheral pulses intact bilaterally. No lower extremity edema.    Respiratory: Lung sounds are clear to auscultation bilaterally without wheezes, rhonchi, or crackles.    GI: Soft, non-distended. Non-tender, no rebound or guarding. No hepatosplenomegaly or masses appreciated. Normal bowel sounds.     Musculoskeletal: Without obvious deformity, normal range of motion. Normal muscle bulk and tone. Distal CMS intact.      Skin: Warm and dry, no rashes or ecchymoses. No mottling of skin.      Neurologic: Patient moves all extremities. Cranial nerves are grossly intact.  is symmetric. Gross strength and sensation are equal bilaterally.  Normal rapid alternating movements.  No pronator drift.  Slight incoordination of finger-to-nose testing with left hand.    Genitourinary: Deferred      Data   Data reviewed today: I reviewed all medications, new labs and imaging results over the last 24 hours. I personally reviewed the EKG tracing showing normal sinus rhythm with PAC.    Recent Labs    Lab 06/10/22  1540   WBC 6.7   HGB 14.7   MCV 82      INR 1.11      POTASSIUM 3.9   CHLORIDE 112*   CO2 27   BUN 30   CR 1.18   ANIONGAP 5   LEROY 9.2   *     Recent Results (from the past 24 hour(s))   MRA Angiogram Neck w/o Contrast    Narrative    EXAM: MR BRAIN W/O CONTRAST, MRA BRAIN ([place]) W/O CONTRAST, MRA NECK (CAROTIDS) W/O CONTRAST  LOCATION: [facility]  DATE/TIME: 6/10/2022 6:27 PM    INDICATION: Weakness. Dizziness. Transient ischemic attack (TIA)  COMPARISON: MRI brain dated 07/13/2020  TECHNIQUE:  1) Routine multiplanar multisequence head MRI without intravenous contrast.  2) 3D time-of-flight head MRA without intravenous contrast.  3) Neck MRA without IV contrast. Stenosis measurements made according to NASCET criteria unless otherwise specified.    FINDINGS:  HEAD MRI:  INTRACRANIAL CONTENTS: Restricted diffusion with associated T2/FLAIR signal hyperintensity predominantly involving the right precentral gyrus suspicious for acute ischemia. Additional chronic bilateral cerebellar hemisphere lacunar infarct. No mass,   acute hemorrhage, or extra-axial fluid collections. Scattered nonspecific T2/FLAIR hyperintensities within the cerebral white matter most consistent with sequelae of mild chronic microangiopathy.     Mild cerebral volume loss without hydrocephalus. Patent basal cisterns. Normal position of the cerebellar tonsils.     SELLA: No abnormality accounting for technique.    OSSEOUS STRUCTURES/SOFT TISSUES: No aggressive osseous lesion involving the calvarium or visualized upper cervical spine. Maintained major intracranial vascular flow voids.    ORBITS: No significant orbital abnormality accounting for technique.     SINUSES/MASTOIDS: Mild mucosal thickening scattered about the paranasal sinuses. No middle ear or mastoid effusion.     HEAD MRA:  ANTERIOR CIRCULATION: No stenosis/occlusion, aneurysm, or high flow vascular malformation.  The anterior communicating artery is patent. The posterior communicating arteries are hypoplastic/absent.     POSTERIOR CIRCULATION: No stenosis/occlusion, aneurysm, or high flow vascular malformation. Balanced vertebrobasilar circulation.     NECK MRA:  RIGHT CAROTID: Normal course and persist caliber of the common carotid artery. Normal course and persist caliber of the extracranial internal carotid artery without evidence of stenosis or dissection.    LEFT CAROTID: Normal course and persist caliber of the common carotid artery. Normal course and persist caliber of the extracranial internal carotid artery without evidence of stenosis or dissection.    VERTEBRAL ARTERIES: Balance configuration without stenosis or dissection.    AORTIC ARCH: Classic three-vessel arch. The origins of the great vessels are unremarkable without significant stenosis.      Impression    IMPRESSION:    HEAD MRI:  1. Findings suspicious for acute ischemia involving the right precentral gyrus.     HEAD MRA:   1. Unremarkable intracranial vasculature.    NECK MRA:  1. Unremarkable neck vasculature.    - - - - - - - - - - - - - - - - - - - - - - - - - - - - - - - - - - - - - - - - - - - - - - - - - - - - - - - - - - - - - - - - - - - - - - - - - - - -   The results above were discussed with Dr. Brewer on 6/10/2022 7:11 PM by Dr. Nathaniel Hansen.  - - - - - - - - - - - - - - - - - - - - - - - - - - - - - - - - - - - - - - - - - - - - - - - - - - - - - - - - - - - - - - - - - - - - - - - - - - - -   MR Brain w/o Contrast    Narrative    EXAM: MR BRAIN W/O CONTRAST, MRA BRAIN ([place]) W/O CONTRAST, MRA NECK (CAROTIDS) W/O CONTRAST  LOCATION: [facility]  DATE/TIME: 6/10/2022 6:27 PM    INDICATION: Weakness. Dizziness. Transient ischemic attack (TIA)  COMPARISON: MRI brain dated 07/13/2020  TECHNIQUE:  1) Routine multiplanar multisequence head MRI without intravenous contrast.  2) 3D time-of-flight head MRA  without intravenous contrast.  3) Neck MRA without IV contrast. Stenosis measurements made according to NASCET criteria unless otherwise specified.    FINDINGS:  HEAD MRI:  INTRACRANIAL CONTENTS: Restricted diffusion with associated T2/FLAIR signal hyperintensity predominantly involving the right precentral gyrus suspicious for acute ischemia. Additional chronic bilateral cerebellar hemisphere lacunar infarct. No mass,   acute hemorrhage, or extra-axial fluid collections. Scattered nonspecific T2/FLAIR hyperintensities within the cerebral white matter most consistent with sequelae of mild chronic microangiopathy.     Mild cerebral volume loss without hydrocephalus. Patent basal cisterns. Normal position of the cerebellar tonsils.     SELLA: No abnormality accounting for technique.    OSSEOUS STRUCTURES/SOFT TISSUES: No aggressive osseous lesion involving the calvarium or visualized upper cervical spine. Maintained major intracranial vascular flow voids.    ORBITS: No significant orbital abnormality accounting for technique.     SINUSES/MASTOIDS: Mild mucosal thickening scattered about the paranasal sinuses. No middle ear or mastoid effusion.     HEAD MRA:  ANTERIOR CIRCULATION: No stenosis/occlusion, aneurysm, or high flow vascular malformation. The anterior communicating artery is patent. The posterior communicating arteries are hypoplastic/absent.     POSTERIOR CIRCULATION: No stenosis/occlusion, aneurysm, or high flow vascular malformation. Balanced vertebrobasilar circulation.     NECK MRA:  RIGHT CAROTID: Normal course and persist caliber of the common carotid artery. Normal course and persist caliber of the extracranial internal carotid artery without evidence of stenosis or dissection.    LEFT CAROTID: Normal course and persist caliber of the common carotid artery. Normal course and persist caliber of the extracranial internal carotid artery without evidence of stenosis or dissection.    VERTEBRAL ARTERIES:  Balance configuration without stenosis or dissection.    AORTIC ARCH: Classic three-vessel arch. The origins of the great vessels are unremarkable without significant stenosis.      Impression    IMPRESSION:    HEAD MRI:  1. Findings suspicious for acute ischemia involving the right precentral gyrus.     HEAD MRA:   1. Unremarkable intracranial vasculature.    NECK MRA:  1. Unremarkable neck vasculature.    - - - - - - - - - - - - - - - - - - - - - - - - - - - - - - - - - - - - - - - - - - - - - - - - - - - - - - - - - - - - - - - - - - - - - - - - - - - -   The results above were discussed with Dr. Brewer on 6/10/2022 7:11 PM by Dr. Nathaniel Hansen.  - - - - - - - - - - - - - - - - - - - - - - - - - - - - - - - - - - - - - - - - - - - - - - - - - - - - - - - - - - - - - - - - - - - - - - - - - - - -   MRA Brain ([place]) wo Contrast    Narrative    EXAM: MR BRAIN W/O CONTRAST, MRA BRAIN ([place]) W/O CONTRAST, MRA NECK (CAROTIDS) W/O CONTRAST  LOCATION: [facility]  DATE/TIME: 6/10/2022 6:27 PM    INDICATION: Weakness. Dizziness. Transient ischemic attack (TIA)  COMPARISON: MRI brain dated 07/13/2020  TECHNIQUE:  1) Routine multiplanar multisequence head MRI without intravenous contrast.  2) 3D time-of-flight head MRA without intravenous contrast.  3) Neck MRA without IV contrast. Stenosis measurements made according to NASCET criteria unless otherwise specified.    FINDINGS:  HEAD MRI:  INTRACRANIAL CONTENTS: Restricted diffusion with associated T2/FLAIR signal hyperintensity predominantly involving the right precentral gyrus suspicious for acute ischemia. Additional chronic bilateral cerebellar hemisphere lacunar infarct. No mass,   acute hemorrhage, or extra-axial fluid collections. Scattered nonspecific T2/FLAIR hyperintensities within the cerebral white matter most consistent with sequelae of mild chronic microangiopathy.     Mild cerebral volume loss without  hydrocephalus. Patent basal cisterns. Normal position of the cerebellar tonsils.     SELLA: No abnormality accounting for technique.    OSSEOUS STRUCTURES/SOFT TISSUES: No aggressive osseous lesion involving the calvarium or visualized upper cervical spine. Maintained major intracranial vascular flow voids.    ORBITS: No significant orbital abnormality accounting for technique.     SINUSES/MASTOIDS: Mild mucosal thickening scattered about the paranasal sinuses. No middle ear or mastoid effusion.     HEAD MRA:  ANTERIOR CIRCULATION: No stenosis/occlusion, aneurysm, or high flow vascular malformation. The anterior communicating artery is patent. The posterior communicating arteries are hypoplastic/absent.     POSTERIOR CIRCULATION: No stenosis/occlusion, aneurysm, or high flow vascular malformation. Balanced vertebrobasilar circulation.     NECK MRA:  RIGHT CAROTID: Normal course and persist caliber of the common carotid artery. Normal course and persist caliber of the extracranial internal carotid artery without evidence of stenosis or dissection.    LEFT CAROTID: Normal course and persist caliber of the common carotid artery. Normal course and persist caliber of the extracranial internal carotid artery without evidence of stenosis or dissection.    VERTEBRAL ARTERIES: Balance configuration without stenosis or dissection.    AORTIC ARCH: Classic three-vessel arch. The origins of the great vessels are unremarkable without significant stenosis.      Impression    IMPRESSION:    HEAD MRI:  1. Findings suspicious for acute ischemia involving the right precentral gyrus.     HEAD MRA:   1. Unremarkable intracranial vasculature.    NECK MRA:  1. Unremarkable neck vasculature.    - - - - - - - - - - - - - - - - - - - - - - - - - - - - - - - - - - - - - - - - - - - - - - - - - - - - - - - - - - - - - - - - - - - - - - - - - - - -   The results above were discussed with Dr. Brewer on 6/10/2022 7:11 PM by Dr. Armstrong  Jade.  - - - - - - - - - - - - - - - - - - - - - - - - - - - - - - - - - - - - - - - - - - - - - - - - - - - - - - - - - - - - - - - - - - - - - - - - - - - -      no

## 2022-08-26 ENCOUNTER — APPOINTMENT (OUTPATIENT)
Dept: ORTHOPEDIC SURGERY | Facility: CLINIC | Age: 82
End: 2022-08-26

## 2022-08-26 PROCEDURE — 20610 DRAIN/INJ JOINT/BURSA W/O US: CPT | Mod: RT

## 2022-08-26 PROCEDURE — 99214 OFFICE O/P EST MOD 30 MIN: CPT | Mod: 25

## 2022-08-26 PROCEDURE — 73110 X-RAY EXAM OF WRIST: CPT | Mod: RT

## 2022-08-26 PROCEDURE — 73030 X-RAY EXAM OF SHOULDER: CPT | Mod: RT

## 2022-08-26 NOTE — HISTORY OF PRESENT ILLNESS
[Right Arm] : right arm [5] : 5 [4] : 4 [Burning] : burning [Shooting] : shooting [Stabbing] : stabbing [Intermittent] : intermittent [Rest] : rest [Exercising] : exercising [de-identified] : Return visit for this 81 year female RHD here today for about two weeks of right shoulder pain.  Painful with motion and unable to fully raise her arm.  Painful to lay on her arm.  Tylenol Arthritis for pain.  \par \par Also complaining of a lump on her right wrist that she noticed some months ago.  \par \par PMH:  On Effient, no NSAIDs.  No prior right shoulder issues.   [] : Post Surgical Visit: no [FreeTextEntry1] : right shoulder and right hand  [FreeTextEntry5] : Pt has a gradual onset of pain after being hospitalized for GI issues for two weeks and started noticing right shoulder pain after that\par \par Pt has a hx of a ganglion cyst on her right wrist, pt has pain as well  [de-identified] : None  [de-identified] : 15+ years

## 2022-08-26 NOTE — PHYSICAL EXAM
[Type 3 acromion] : Type 3 acromion [5___] : pinch 5[unfilled]/5 [Right] : right wrist [Degenerative change] : Degenerative change [FreeTextEntry9] : T8 IR. [de-identified] : +Jamal [TWNoteComboBox4] : passive forward flexion 140 degrees [de-identified] : external rotation 60 degrees [] : no erythema [FreeTextEntry3] : 12-13 mm soft fluctuant sub-q lump radial aspect of the right wrist. [FreeTextEntry8] : Widening of the scapholunate interval.  Severe first [facility] osteoarthritis.

## 2022-09-14 NOTE — DISCHARGE NOTE ADULT - CARE PLAN
Problem: Fall Injury Risk  Goal: Absence of Fall and Fall-Related Injury  Intervention: Promote Injury-Free Environment  Recent Flowsheet Documentation  Taken 9/13/2022 2033 by Loretta Hernandez RN  Safety Promotion/Fall Prevention: clutter free environment maintained     Problem: Fall Injury Risk  Goal: Absence of Fall and Fall-Related Injury  Outcome: Ongoing, Progressing  Intervention: Promote Injury-Free Environment  Recent Flowsheet Documentation  Taken 9/13/2022 2033 by Loretta Hernandez RN  Safety Promotion/Fall Prevention: clutter free environment maintained   Goal Outcome Evaluation:                       Principal Discharge DX:	NSTEMI (non-ST elevated myocardial infarction)  Goal:	Get better.  Assessment and plan of treatment:	Low salt, low cholesterol diet.  Patient is being transferred to [facility] for further care.   Patient to follow up with Dr. Edmar Amaya after discharge from [place].  Secondary Diagnosis:	Essential hypertension  Secondary Diagnosis:	Gastroesophageal reflux disease, esophagitis presence not specified  Secondary Diagnosis:	OAB (overactive bladder)  Secondary Diagnosis:	Primary osteoarthritis, unspecified site

## 2022-11-17 ENCOUNTER — APPOINTMENT (OUTPATIENT)
Dept: ORTHOPEDIC SURGERY | Facility: CLINIC | Age: 82
End: 2022-11-17

## 2022-11-17 VITALS — HEIGHT: 64 IN | WEIGHT: 184 LBS | BODY MASS INDEX: 31.41 KG/M2

## 2022-11-17 DIAGNOSIS — I51.9 HEART DISEASE, UNSPECIFIED: ICD-10-CM

## 2022-11-17 DIAGNOSIS — I10 ESSENTIAL (PRIMARY) HYPERTENSION: ICD-10-CM

## 2022-11-17 DIAGNOSIS — J45.909 UNSPECIFIED ASTHMA, UNCOMPLICATED: ICD-10-CM

## 2022-11-17 DIAGNOSIS — M67.431 GANGLION, RIGHT WRIST: ICD-10-CM

## 2022-11-17 DIAGNOSIS — Z78.9 OTHER SPECIFIED HEALTH STATUS: ICD-10-CM

## 2022-11-17 PROCEDURE — 99214 OFFICE O/P EST MOD 30 MIN: CPT | Mod: 25

## 2022-11-17 PROCEDURE — 20610 DRAIN/INJ JOINT/BURSA W/O US: CPT | Mod: RT

## 2022-11-17 PROCEDURE — 20612 ASPIRATE/INJ GANGLION CYST: CPT | Mod: RT

## 2022-11-17 NOTE — HISTORY OF PRESENT ILLNESS
[Right Arm] : right arm [4] : 4 [Burning] : burning [Shooting] : shooting [Stabbing] : stabbing [Intermittent] : intermittent [Rest] : rest [Exercising] : exercising [10] : 10 [Meds] : meds [Retired] : Work status: retired [de-identified] : 11/17/22:  Patient returns today 2 1/2 months after right shoulder cortisone injection for bursitis. Was doing better until 2 months ago after moving something and felt increasing pain radiating down rt arm.\par    Also has noticed recurrence of ganglion rt wrist.despite the aspiration x 2 1/2 months ago.\par \par 08/26/22:  Return visit for this 81 year female RHD here today for about two weeks of right shoulder pain.  Painful with motion and unable to fully raise her arm.  Painful to lay on her arm.  Tylenol Arthritis for pain.  \par \par Also complaining of a lump on her right wrist that she noticed some months ago.  \par \par PMH:  On Effient, no NSAIDs.  No prior right shoulder issues.   [] : Post Surgical Visit: no [FreeTextEntry1] : right shoulder and right hand  [FreeTextEntry5] : Pt has a gradual onset of pain after being hospitalized for GI issues for two weeks and started noticing right shoulder pain after that\par \par Pt has a hx of a ganglion cyst on her right wrist, pt has pain as well  [FreeTextEntry7] : down arm [de-identified] : certain motion [de-identified] : None  [de-identified] : 15+ years

## 2022-11-17 NOTE — PROCEDURE
[Large Joint Injection] : Large joint injection [Right] : of the right [Subacromial Space] : subacromial space [Pain] : pain [Inflammation] : inflammation [___ cc    1%] : Lidocaine ~Vcc of 1%  [___ cc    40mg] : Methylprednisolone (Depomedrol) ~Vcc of 40 mg  [] : Patient tolerated procedure well [Call if redness, pain or fever occur] : call if redness, pain or fever occur [Apply ice for 15min out of every hour for the next 12-24 hours as tolerated] : apply ice for 15 minutes out of every hour for the next 12-24 hours as tolerated [Risks, benefits, alternatives discussed / Verbal consent obtained] : the risks benefits, and alternatives have been discussed, and verbal consent was obtained

## 2022-11-17 NOTE — PHYSICAL EXAM
[Normal Mood and Affect] : normal mood and affect [Orientated] : orientated [Able to Communicate] : able to communicate [Well Developed] : well developed [Well Nourished] : well nourished [Type 3 acromion] : Type 3 acromion [Degenerative change] : Degenerative change [FreeTextEntry8] : Widening of the scapholunate interval.  Severe first CMC osteoarthritis. [Right] : right hand [5 ___] : forward flexion 5[unfilled]/5 [5___] : external rotation 5[unfilled]/5 [FreeTextEntry9] : T8 IR. [de-identified] : +Jamal [TWNoteComboBox4] : passive forward flexion 140 degrees [de-identified] : external rotation 60 degrees [] : no erythema [FreeTextEntry3] : has a 2cm ganglion cyst over radial artery

## 2023-02-07 NOTE — ED ADULT NURSE NOTE - CAS TRG GENERAL NORM CIRC DET
Strong peripheral pulses Star Wedge Flap Text: Because of the tightness of the surrounding skin, the full-thickness nature of the defect with exposed cartilage, and to avoid deformity, a star wedge advancement flap was planned.  After prep and anesthesia, a full-thickness triangular wedge of tissue was excised, as well as two Burow's triangles on either side of this to reduce cupping deformity. The chondrocutaneous flaps were then advanced and closed in a layered fashion.

## 2023-02-23 ENCOUNTER — OFFICE (OUTPATIENT)
Dept: URBAN - METROPOLITAN AREA CLINIC 109 | Facility: CLINIC | Age: 83
Setting detail: OPHTHALMOLOGY
End: 2023-02-23
Payer: MEDICARE

## 2023-02-23 VITALS — HEIGHT: 55 IN

## 2023-02-23 DIAGNOSIS — H25.13: ICD-10-CM

## 2023-02-23 PROCEDURE — 92014 COMPRE OPH EXAM EST PT 1/>: CPT | Performed by: OPHTHALMOLOGY

## 2023-02-23 ASSESSMENT — VISUAL ACUITY
OD_BCVA: 20/30-2
OS_BCVA: 20/30

## 2023-02-23 ASSESSMENT — REFRACTION_CURRENTRX
OS_ADD: +2.50
OD_SPHERE: +1.75
OS_OVR_VA: 20/
OS_SPHERE: +1.75
OD_OVR_VA: 20/
OD_ADD: +2.50

## 2023-02-23 ASSESSMENT — REFRACTION_AUTOREFRACTION
OS_SPHERE: +1.00
OD_SPHERE: +2.00
OS_CYLINDER: -0.25
OD_AXIS: 93
OD_CYLINDER: -0.50
OS_AXIS: 158

## 2023-02-23 ASSESSMENT — CONFRONTATIONAL VISUAL FIELD TEST (CVF)
OD_FINDINGS: FULL
OS_FINDINGS: FULL

## 2023-02-23 ASSESSMENT — SPHEQUIV_DERIVED
OD_SPHEQUIV: 1.75
OS_SPHEQUIV: 0.875

## 2023-02-23 ASSESSMENT — REFRACTION_MANIFEST
OS_SPHERE: +1.25
OS_ADD: +2.50
OD_SPHERE: +1.50
OD_ADD: +2.50

## 2023-02-24 ENCOUNTER — APPOINTMENT (OUTPATIENT)
Dept: ORTHOPEDIC SURGERY | Facility: CLINIC | Age: 83
End: 2023-02-24
Payer: MEDICARE

## 2023-02-24 VITALS — HEIGHT: 64 IN | BODY MASS INDEX: 31.41 KG/M2 | WEIGHT: 184 LBS

## 2023-02-24 PROCEDURE — 99213 OFFICE O/P EST LOW 20 MIN: CPT | Mod: 25

## 2023-02-24 PROCEDURE — 20610 DRAIN/INJ JOINT/BURSA W/O US: CPT | Mod: RT

## 2023-02-24 NOTE — PHYSICAL EXAM
[Normal Mood and Affect] : normal mood and affect [Orientated] : orientated [Able to Communicate] : able to communicate [Well Developed] : well developed [Well Nourished] : well nourished [5 ___] : forward flexion 5[unfilled]/5 [5___] : external rotation 5[unfilled]/5 [Type 3 acromion] : Type 3 acromion [Right] : right hand [FreeTextEntry9] : T8 IR. [de-identified] : +Jamal [TWNoteComboBox4] : passive forward flexion 140 degrees [de-identified] : external rotation 60 degrees [] : no erythema [FreeTextEntry3] : Ganglion resolved

## 2023-02-24 NOTE — DISCUSSION/SUMMARY
[de-identified] : "Written by Natalie Nolen, acting as Scribe for Jonny Deleon MD."\par \par Dr. Deleon - \par The documentation recorded by the scribe accurately reflects the service I personally performed and the decisions made by me.

## 2023-02-24 NOTE — HISTORY OF PRESENT ILLNESS
[10] : 10 [7] : 7 [Dull/Aching] : dull/aching [de-identified] : 02/24/23:  Returns today three months after cortisone injection right shoulder which helped until about six weeks ago when she was reaching for something. difficulty reaching behind.  The ganglion on her right wrist has not returned. \par \par 11/17/22:  Patient returns today 2 1/2 months after right shoulder cortisone injection for bursitis. Was doing better until 2 months ago after moving something and felt increasing pain radiating down rt arm.\par    Also has noticed recurrence of ganglion rt wrist.despite the aspiration x 2 1/2 months ago.\par \par 08/26/22:  Return visit for this 81 year female RHD here today for about two weeks of right shoulder pain.  Painful with motion and unable to fully raise her arm.  Painful to lay on her arm.  Tylenol Arthritis for pain.  \par \par Also complaining of a lump on her right wrist that she noticed some months ago.  \par \par PMH:  On Effient, no NSAIDs.  No prior right shoulder issues.   [FreeTextEntry1] : R wrist,shoulder [de-identified] : none

## 2023-02-27 ENCOUNTER — RESULT REVIEW (OUTPATIENT)
Age: 83
End: 2023-02-27

## 2023-03-10 ENCOUNTER — APPOINTMENT (OUTPATIENT)
Dept: ORTHOPEDIC SURGERY | Facility: CLINIC | Age: 83
End: 2023-03-10
Payer: MEDICARE

## 2023-03-10 VITALS — BODY MASS INDEX: 31.41 KG/M2 | WEIGHT: 184 LBS | HEIGHT: 64 IN

## 2023-03-10 PROCEDURE — 99213 OFFICE O/P EST LOW 20 MIN: CPT

## 2023-03-10 NOTE — PHYSICAL EXAM
[Normal Mood and Affect] : normal mood and affect [Orientated] : orientated [Able to Communicate] : able to communicate [Well Developed] : well developed [Well Nourished] : well nourished [5 ___] : forward flexion 5[unfilled]/5 [5___] : external rotation 5[unfilled]/5 [Right] : right hand [FreeTextEntry9] : T8 IR. [de-identified] : +Jamal [TWNoteComboBox4] : passive forward flexion 140 degrees [de-identified] : external rotation 60 degrees [] : no erythema [FreeTextEntry3] : Ganglion resolved

## 2023-03-10 NOTE — HISTORY OF PRESENT ILLNESS
[8] : 8 [0] : 0 [Sharp] : sharp [Stabbing] : stabbing [Intermittent] : intermittent [Retired] : Work status: retired [de-identified] : 03/10/23:  Here today for right shoulder MRI results. Feeling 50%better after injection x 2 weeks ago. 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 This finding may be secondary to a nonacute injury or the presence of an element of adhesive capsulitis.\par __________________________\par 02/24/23:  Returns today three months after cortisone injection right shoulder which helped until about six weeks ago when she was reaching for something. difficulty reaching behind.  The ganglion on her right wrist has not returned. \par \par 11/17/22:  Patient returns today 2 1/2 months after right shoulder cortisone injection for bursitis. Was doing better until 2 months ago after moving something and felt increasing pain radiating down rt arm.\par    Also has noticed recurrence of ganglion rt wrist.despite the aspiration x 2 1/2 months ago.\par \par 08/26/22:  Return visit for this 81 year female RHD here today for about two weeks of right shoulder pain.  Painful with motion and unable to fully raise her arm.  Painful to lay on her arm.  Tylenol Arthritis for pain.  \par \par Also complaining of a lump on her right wrist that she noticed some months ago.  \par \par PMH:  On Effient, no NSAIDs.  No prior right shoulder issues.   [] : no [FreeTextEntry1] : right shoulder

## 2023-07-13 ENCOUNTER — APPOINTMENT (OUTPATIENT)
Dept: ORTHOPEDIC SURGERY | Facility: CLINIC | Age: 83
End: 2023-07-13
Payer: MEDICARE

## 2023-07-13 VITALS — BODY MASS INDEX: 31.41 KG/M2 | HEIGHT: 64 IN | WEIGHT: 184 LBS

## 2023-07-13 DIAGNOSIS — M18.11 UNILATERAL PRIMARY OSTEOARTHRITIS OF FIRST CARPOMETACARPAL JOINT, RIGHT HAND: ICD-10-CM

## 2023-07-13 PROCEDURE — 20610 DRAIN/INJ JOINT/BURSA W/O US: CPT | Mod: RT

## 2023-07-13 PROCEDURE — 99214 OFFICE O/P EST MOD 30 MIN: CPT | Mod: 25

## 2023-07-13 PROCEDURE — 20605 DRAIN/INJ JOINT/BURSA W/O US: CPT | Mod: RT

## 2023-07-13 NOTE — PHYSICAL EXAM
[Normal Mood and Affect] : normal mood and affect [Orientated] : orientated [Able to Communicate] : able to communicate [Well Developed] : well developed [Well Nourished] : well nourished [5 ___] : forward flexion 5[unfilled]/5 [5___] : external rotation 5[unfilled]/5 [Right] : right hand [FreeTextEntry9] : T8 IR. [de-identified] : +Jamal [TWNoteComboBox4] : passive forward flexion 140 degrees [de-identified] : external rotation 60 degrees [] : no erythema [FreeTextEntry3] : Ganglion resolved

## 2023-07-13 NOTE — PROCEDURE
[Large Joint Injection] : Large joint injection [Subacromial Space] : subacromial space [Betadine] : betadine [Ethyl Chloride sprayed topically] : ethyl chloride sprayed topically [Call if redness, pain or fever occur] : call if redness, pain or fever occur [Apply ice for 15min out of every hour for the next 12-24 hours as tolerated] : apply ice for 15 minutes out of every hour for the next 12-24 hours as tolerated [Medium Joint Injection] : medium joint injection [Right] : of the right [CMC Joint] : CMC joint [Pain] : pain [Inflammation] : inflammation [Alcohol] : alcohol [___ cc    1%] : Lidocaine ~Vcc of 1%  [___ cc    40mg] : Methylprednisolone (Depomedrol) ~Vcc of 40 mg  [] : Patient tolerated procedure well [Previous OTC use and PT nontherapeutic] : patient has tried OTC's including aspirin, Ibuprofen, Aleve, etc or prescription NSAIDS, and/or exercises at home and/or physical therapy without satisfactory response [Patient had decreased mobility in the joint] : patient had decreased mobility in the joint [Risks, benefits, alternatives discussed / Verbal consent obtained] : the risks benefits, and alternatives have been discussed, and verbal consent was obtained

## 2023-07-13 NOTE — HISTORY OF PRESENT ILLNESS
[6] : 6 [Dull/Aching] : dull/aching [Constant] : constant [de-identified] : 07/13/23:  Returns today complaining of recurrent right shoulder pain x last 1 months duration Can't lift her rt arm overhead or behind her back. Can't lie on rt side at night. Never went for PT as previously discussed. Last injection x 4 months ago which helped.\par   Also c/o recent pain rt thumb with gripping and grasping. Known to have CMC arthritis. Would like to try an injection.\par \par 03/10/23:  Here today for right shoulder MRI results. Feeling 50%better after injection x 2 weeks ago. 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 This finding may be secondary to a nonacute injury or the presence of an element of adhesive capsulitis.\par __________________________\par 02/24/23:  Returns today three months after cortisone injection right shoulder which helped until about six weeks ago when she was reaching for something. difficulty reaching behind.  The ganglion on her right wrist has not returned. \par \par 11/17/22:  Patient returns today 2 1/2 months after right shoulder cortisone injection for bursitis. Was doing better until 2 months ago after moving something and felt increasing pain radiating down rt arm.\par    Also has noticed recurrence of ganglion rt wrist.despite the aspiration x 2 1/2 months ago.\par \par 08/26/22:  Return visit for this 81 year female RHD here today for about two weeks of right shoulder pain.  Painful with motion and unable to fully raise her arm.  Painful to lay on her arm.  Tylenol Arthritis for pain.  \par \par Also complaining of a lump on her right wrist that she noticed some months ago.  \par \par PMH:  On Effient, no NSAIDs.  No prior right shoulder issues.   [de-identified] : reaching

## 2023-07-14 RX ORDER — TRAMADOL HYDROCHLORIDE AND ACETAMINOPHEN 37.5; 325 MG/1; MG/1
37.5-325 TABLET, FILM COATED ORAL
Qty: 60 | Refills: 0 | Status: ACTIVE | COMMUNITY
Start: 2023-07-14 | End: 1900-01-01

## 2024-04-30 ENCOUNTER — APPOINTMENT (OUTPATIENT)
Dept: ORTHOPEDIC SURGERY | Facility: CLINIC | Age: 84
End: 2024-04-30
Payer: MEDICARE

## 2024-04-30 VITALS — BODY MASS INDEX: 31.41 KG/M2 | WEIGHT: 184 LBS | HEIGHT: 64 IN

## 2024-04-30 DIAGNOSIS — M75.121 COMPLETE ROTATOR CUFF TEAR OR RUPTURE OF RIGHT SHOULDER, NOT SPECIFIED AS TRAUMATIC: ICD-10-CM

## 2024-04-30 DIAGNOSIS — M10.071 IDIOPATHIC GOUT, RIGHT ANKLE AND FOOT: ICD-10-CM

## 2024-04-30 DIAGNOSIS — M75.51 BURSITIS OF RIGHT SHOULDER: ICD-10-CM

## 2024-04-30 PROCEDURE — 99214 OFFICE O/P EST MOD 30 MIN: CPT

## 2024-04-30 PROCEDURE — 73610 X-RAY EXAM OF ANKLE: CPT | Mod: RT

## 2024-04-30 PROCEDURE — 73630 X-RAY EXAM OF FOOT: CPT | Mod: RT

## 2024-04-30 RX ORDER — METHYLPREDNISOLONE 4 MG/1
4 TABLET ORAL
Qty: 1 | Refills: 1 | Status: ACTIVE | COMMUNITY
Start: 2024-04-30 | End: 1900-01-01

## 2024-04-30 RX ORDER — ZOLPIDEM TARTRATE 10 MG/1
10 TABLET, FILM COATED ORAL
Refills: 0 | Status: ACTIVE | COMMUNITY

## 2024-04-30 RX ORDER — TIOTROPIUM BROMIDE AND OLODATEROL 3.124; 2.736 UG/1; UG/1
SPRAY, METERED RESPIRATORY (INHALATION)
Refills: 0 | Status: ACTIVE | COMMUNITY

## 2024-04-30 RX ORDER — PANTOPRAZOLE SODIUM 40 MG/1
40 GRANULE, DELAYED RELEASE ORAL
Refills: 0 | Status: ACTIVE | COMMUNITY

## 2024-04-30 RX ORDER — EZETIMIBE 10 MG/1
10 TABLET ORAL
Refills: 0 | Status: ACTIVE | COMMUNITY

## 2024-04-30 RX ORDER — PRASUGREL HYDROCHLORIDE 5 MG/1
5 TABLET, COATED ORAL
Refills: 0 | Status: ACTIVE | COMMUNITY

## 2024-04-30 RX ORDER — CRANBERRY FRUIT EXTRACT 200 MG
CAPSULE ORAL
Refills: 0 | Status: ACTIVE | COMMUNITY

## 2024-04-30 RX ORDER — CARVEDILOL 12.5 MG/1
12.5 TABLET, FILM COATED ORAL
Refills: 0 | Status: ACTIVE | COMMUNITY

## 2024-04-30 RX ORDER — SPIRONOLACTONE 50 MG/1
TABLET ORAL
Refills: 0 | Status: ACTIVE | COMMUNITY

## 2024-04-30 RX ORDER — FUROSEMIDE 80 MG/1
TABLET ORAL
Refills: 0 | Status: ACTIVE | COMMUNITY

## 2024-04-30 RX ORDER — ASPIRIN 81 MG
81 TABLET, DELAYED RELEASE (ENTERIC COATED) ORAL
Refills: 0 | Status: ACTIVE | COMMUNITY

## 2024-04-30 RX ORDER — UBIDECARENONE/VIT E ACET 100MG-5
1000 CAPSULE ORAL
Refills: 0 | Status: ACTIVE | COMMUNITY

## 2024-04-30 RX ORDER — CEPHALEXIN 500 MG/1
500 CAPSULE ORAL
Refills: 0 | Status: ACTIVE | COMMUNITY

## 2024-04-30 RX ORDER — FESOTERODINE FUMARATE 8 MG/1
8 TABLET, FILM COATED, EXTENDED RELEASE ORAL
Refills: 0 | Status: ACTIVE | COMMUNITY

## 2024-04-30 RX ORDER — ATORVASTATIN CALCIUM 40 MG/1
40 TABLET, FILM COATED ORAL
Refills: 0 | Status: ACTIVE | COMMUNITY

## 2024-04-30 RX ORDER — SACUBITRIL AND VALSARTAN 97; 103 MG/1; MG/1
97-103 TABLET, FILM COATED ORAL
Refills: 0 | Status: ACTIVE | COMMUNITY

## 2024-04-30 NOTE — PHYSICAL EXAM
[Normal Mood and Affect] : normal mood and affect [Able to Communicate] : able to communicate [Well Developed] : well developed [Well Nourished] : well nourished [1st] : 1st [NL (40)] : MTP joint DF 40 degrees [NL (20)] : MTP joint PF 20 degrees [5___] : eversion 5[unfilled]/5 [] : ambulation with cane [Right] : right foot [There are no fractures, subluxations or dislocations. No significant abnormalities are seen] : There are no fractures, subluxations or dislocations. No significant abnormalities are seen [FreeTextEntry3] : has 3+ lymphedema from midcalf to dorsum rt foot. NO warmth [FreeTextEntry9] : Pain with passive rom rt ankle and 1st MTP joint. Limited subtalar motion w/o pain [de-identified] : Spurs posterior and medial tubercle heel.

## 2024-04-30 NOTE — PLAN
[TextEntry] : The patient was advised of the diagnosis. The natural history of the pathology was explained in full to the patient in layman's terms. All questions were answered. The risks and benefits of surgical and non-surgical treatment alternatives were explained in full to the patient.   Medrol dose pack was prescribed. Risks and benefits were explained including but not limited to the possibilities of gastritis, indigestion, and other GI side effects. It was also explained that in patients with a history of diabetes mellitus, it may temporarily elevate their blood sugars which should be monitored at home. A refill was also prescribed to take the subsequent week if needed.   Go into the cam boot she has at home.  If she is not 90% better after the MDP she is to return.

## 2024-04-30 NOTE — HISTORY OF PRESENT ILLNESS
[8] : 8 [2] : 2 [Dull/Aching] : dull/aching [Ice] : ice [de-identified] : 4/30/24  Return visit for this 83 year old female complaining of spontaneous onset of severe rt foot pain x 6 days ago which woke her up in the middle of the night.  C/o pain and swelling since. Started behind her ankle and now involves big toe. denies a hx of gout, but has a hx of chronic hyperuricemia. Wearing a postop shoe due to swelling.  Last uric acid on 03/19/24 was 10.2, [] : no [FreeTextEntry1] : right foot [FreeTextEntry3] : 4/24/24 [FreeTextEntry5] : no injury woke up in pain using brace noticed swelling no n/t [de-identified] : activity

## 2024-11-14 ENCOUNTER — OFFICE (OUTPATIENT)
Dept: URBAN - METROPOLITAN AREA CLINIC 109 | Facility: CLINIC | Age: 84
Setting detail: OPHTHALMOLOGY
End: 2024-11-14
Payer: MEDICARE

## 2024-11-14 VITALS — HEIGHT: 55 IN

## 2024-11-14 DIAGNOSIS — H25.13: ICD-10-CM

## 2024-11-14 PROCEDURE — 92014 COMPRE OPH EXAM EST PT 1/>: CPT | Performed by: OPHTHALMOLOGY

## 2024-11-14 ASSESSMENT — REFRACTION_CURRENTRX
OD_OVR_VA: 20/
OS_ADD: +2.50
OD_ADD: +2.50
OS_OVR_VA: 20/
OS_SPHERE: +1.75
OD_SPHERE: +1.75

## 2024-11-14 ASSESSMENT — REFRACTION_MANIFEST
OD_SPHERE: +1.50
OD_AXIS: 90
OD_SPHERE: +1.50
OD_ADD: +2.50
OS_SPHERE: +1.25
OD_CYLINDER: -0.25
OD_ADD: +2.50
OS_SPHERE: +0.25
OS_VA1: 20/30
OS_ADD: +2.50
OS_ADD: +2.50

## 2024-11-14 ASSESSMENT — REFRACTION_AUTOREFRACTION
OD_AXIS: 94
OS_CYLINDER: -0.25
OD_SPHERE: +1.50
OD_CYLINDER: -0.75
OS_AXIS: 20
OS_SPHERE: +0.25

## 2024-11-14 ASSESSMENT — VISUAL ACUITY
OS_BCVA: 20/30-2
OD_BCVA: 20/50-2

## 2024-11-14 ASSESSMENT — TONOMETRY
OD_IOP_MMHG: 16
OS_IOP_MMHG: 16

## 2024-11-14 ASSESSMENT — CONFRONTATIONAL VISUAL FIELD TEST (CVF)
OD_FINDINGS: FULL
OS_FINDINGS: FULL

## 2024-12-12 ENCOUNTER — APPOINTMENT (OUTPATIENT)
Dept: ORTHOPEDIC SURGERY | Facility: CLINIC | Age: 84
End: 2024-12-12
Payer: MEDICARE

## 2024-12-12 DIAGNOSIS — S92.511A DISPLACED FRACTURE OF PROXIMAL PHALANX OF RIGHT LESSER TOE(S), INITIAL ENCOUNTER FOR CLOSED FRACTURE: ICD-10-CM

## 2024-12-12 DIAGNOSIS — M79.671 PAIN IN RIGHT FOOT: ICD-10-CM

## 2024-12-12 DIAGNOSIS — I89.0 LYMPHEDEMA, NOT ELSEWHERE CLASSIFIED: ICD-10-CM

## 2024-12-12 PROCEDURE — L3260: CPT | Mod: KX,RT

## 2024-12-12 PROCEDURE — 99214 OFFICE O/P EST MOD 30 MIN: CPT | Mod: 25

## 2024-12-12 PROCEDURE — 73630 X-RAY EXAM OF FOOT: CPT | Mod: RT

## 2025-01-03 ENCOUNTER — APPOINTMENT (OUTPATIENT)
Dept: ORTHOPEDIC SURGERY | Facility: CLINIC | Age: 85
End: 2025-01-03
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
## 2025-09-02 ENCOUNTER — APPOINTMENT (OUTPATIENT)
Dept: ORTHOPEDIC SURGERY | Facility: CLINIC | Age: 85
End: 2025-09-02

## 2025-09-02 VITALS — HEIGHT: 64 IN | WEIGHT: 195 LBS | BODY MASS INDEX: 33.29 KG/M2

## 2025-09-02 DIAGNOSIS — J44.9 CHRONIC OBSTRUCTIVE PULMONARY DISEASE, UNSPECIFIED: ICD-10-CM

## 2025-09-02 DIAGNOSIS — I25.2 OLD MYOCARDIAL INFARCTION: ICD-10-CM

## 2025-09-02 DIAGNOSIS — S42.252A DISPLACED FRACTURE OF GREATER TUBEROSITY OF LEFT HUMERUS, INITIAL ENCOUNTER FOR CLOSED FRACTURE: ICD-10-CM

## 2025-09-02 PROCEDURE — L3660: CPT

## 2025-09-02 PROCEDURE — 73030 X-RAY EXAM OF SHOULDER: CPT | Mod: LT

## 2025-09-02 PROCEDURE — 99213 OFFICE O/P EST LOW 20 MIN: CPT | Mod: 25

## 2025-09-02 PROCEDURE — 23620 CLTX GR HMRL TBRS FX WO MNPJ: CPT | Mod: LT

## 2025-09-08 ENCOUNTER — RESULT REVIEW (OUTPATIENT)
Age: 85
End: 2025-09-08

## 2025-09-12 ENCOUNTER — NON-APPOINTMENT (OUTPATIENT)
Age: 85
End: 2025-09-12

## 2025-09-16 ENCOUNTER — APPOINTMENT (OUTPATIENT)
Dept: ORTHOPEDIC SURGERY | Facility: CLINIC | Age: 85
End: 2025-09-16
Payer: MEDICARE

## 2025-09-16 DIAGNOSIS — S42.252A DISPLACED FRACTURE OF GREATER TUBEROSITY OF LEFT HUMERUS, INITIAL ENCOUNTER FOR CLOSED FRACTURE: ICD-10-CM

## 2025-09-16 PROCEDURE — 73030 X-RAY EXAM OF SHOULDER: CPT | Mod: LT

## 2025-09-16 PROCEDURE — 99024 POSTOP FOLLOW-UP VISIT: CPT
